# Patient Record
(demographics unavailable — no encounter records)

---

## 2017-01-01 NOTE — ERD
ER Documentation


Chief Complaint


Chief Complaint


fever today no meds since 1600





HPI


11 month and 15-day-old boy was brought in by parents here in the emergency 

department for fever, ear pulling since this morning.  Mother also stated that 

patient has nasal congestion. Mother stated that patient did not experience any 

head injury, neck stiffness, difficulty swallowing, loss of appetite, 

difficulty breathing when lying flat, abdominal pain, nausea, vomiting, 

constipation, diarrhea, urinary symptoms, recent travel, recent antibiotic use 

in the last 3 months, recent exposure to any illness, difficulty walking.





ROS


All systems reviewed and are negative except as per history of present illness.





Medications


Home Meds


Active Scripts


Acetaminophen* (Tylenol*) 160 Mg/5ML-Ped Cup, 3.5 ML PO Q4H Y for PAIN AND OR 

ELEVATED TEMP, #120 ML


   Prov:RIGO NOLEN PA-C         9/10/17


Amoxicillin* (Amoxicillin* Susp) 250 Mg/5 Ml Susp.recon, 3.9 ML PO BID for 10 

Days, BOTTLE


   Prov:RIGO NOLEN PA-C         9/10/17





Allergies


Allergies:  


Coded Allergies:  


     No Known Allergy (Unverified , 12/18/17)





PMhx/Soc


Medical and Surgical Hx:  pt denies Medical Hx, pt denies Surgical Hx


Hx Alcohol Use:  No


Hx Substance Use:  No


Hx Tobacco Use:  No


Smoking Status:  Never smoker





Physical Exam


Vitals





Vital Signs








  Date Time  Temp Pulse Resp B/P Pulse Ox O2 Delivery O2 Flow Rate FiO2


 


12/17/17 22:52 101.0 161 24  97   








Physical Exam


Const: Awake.  Age-appropriate.  Smiling.  Interacting.


Head:   Atraumatic 


Eyes:    Normal Conjunctiva


ENT:    Normal External Ears, Nose and Mouth.  Left ear: 40% earwax.  TM is 

erythematous.  Right ear: 52% earwax.  TM is erythematous.  No bleeding.  No 

discharge to bilateral ears.  Throat: Uvula is midline and not displaced.  

Tonsils are +2 bilaterally with redness and has no exudates.  Tolerating 

secretions.  Patent airway.


Neck:               Full range of motion..~ No meningismus.  Next stiffness.  

Negative Kernig sign.  Negative Brudzinski sign.  No signs of meningeal 

irritation.  


Resp:    Clear to auscultation bilaterally


Cardio:    Regular rate and rhythm, no murmurs


Abd:    Soft, non tender, non distended. Normal bowel sounds.  No abdominal 

tenderness.


Skin:    No petechiae or rashes


Back:    No midline or flank tenderness


Ext:    No cyanosis, or edema


Neur:    Awake and alert.  No neurological deficits.


Psych:    Normal Mood and Affect





Procedures/MDM


Treatment: Motrin.  Tylenol.  





Reevaluation: Temperature/fever is trending down.  No nausea and vomiting here 

in the emergency department.  Lung sounds are clear to auscultation.  No 

abdominal tenderness to likely palpation.  Negative Rovsing's sign.  Negative 

Markle sign.  Negative psoas sign.





Differential: I have low suspicion for severe or serious bacterial infection, 

sepsis, meningitis, pneumonia due to my physical exam.  Patient has no 

neurological deficits, no neck stiffness, no abdominal tenderness, lung sounds 

are clear to auscultation.





Final diagnosis: Pharyngitis, otitis media, fever.





Prescription: Augmentin.  Motrin.  Tylenol.





Follow-up with pediatrician the next 3-4 days.  Come back here in the emergency 

department for any new symptoms or any worsening of symptoms.  All questions 

and concerns are answered.  Patient and family member verbalized understanding 

and agreed with plan of care.





Hemodynamically stable on discharge.





Departure


Diagnosis:  


 Primary Impression:  


 Otitis media


 Additional Impressions:  


 Fever


 Pharyngitis


Condition:  Stable





Additional Instructions:  


Follow-up with pediatrician the next 3-4 days.  Come back here in the emergency 

department for any new symptoms or any worsening of symptoms.  All questions 

and concerns are answered.  Patient and family member verbalized understanding 

and agreed with plan of care.











SARA FERNANDO Dec 18, 2017 01:24

## 2017-01-01 NOTE — ERD
ER Documentation


Chief Complaint


Date/Time


DATE: 9/10/17 


TIME: 22:05


Chief Complaint


Temp today. 100.4 Motrin at 1930. Denies runny nose, cough, n/v/d





HPI


8-month-old male brought into the emergency department by mother for fever that 

started today.  States ibuprofen was given earlier today.  She denies any cough

, nausea, vomiting, diarrhea, constipation or congestion.  States that he does 

pull his left ear.





ROS


All systems reviewed and are negative except as per history of present illness.





Medications


Home Meds


Active Scripts


Acetaminophen* (Tylenol*) 160 Mg/5ML-Ped Cup, 3.5 ML PO Q4H Y for PAIN AND OR 

ELEVATED TEMP, #120 ML


   Prov:RIGO NOLEN PA-C         9/10/17


Amoxicillin* (Amoxicillin* Susp) 250 Mg/5 Ml Susp.recon, 3.9 ML PO BID for 10 

Days, BOTTLE


   Prov:RIGO NOLEN PA-C         9/10/17





Allergies


Allergies:  


Coded Allergies:  


     No Known Allergy (Unverified , 9/10/17)





PMhx/Soc


Medical and Surgical Hx:  pt denies Medical Hx, pt denies Surgical Hx


Hx Alcohol Use:  No


Hx Substance Use:  No


Hx Tobacco Use:  No


Smoking Status:  Never smoker





Physical Exam


Vitals





Vital Signs








  Date Time  Temp Pulse Resp B/P Pulse Ox O2 Delivery O2 Flow Rate FiO2


 


9/10/17 21:48 98.0       


 


9/10/17 21:17 99.4 134 28  99   








Physical Exam


Const:  [Well-developed well-nourished no acute distress


Head:   Atraumatic 


Eyes:    Normal Conjunctiva


ENT:    Normal External Ears, Nose and Mouth.


Dependent membrane is erythematous


Neck:               Full range of motion..~ No meningismus.


Resp:    Clear to auscultation bilaterally


Cardio:    Regular rate and rhythm, no murmurs


Abd:    Soft, non tender, non distended. Normal bowel sounds


Skin:    No petechiae or rashes


Back:    No midline or flank tenderness


Ext:    No cyanosis, or edema


Neur:    Awake and alert


Psych:    Normal Mood and Affect





Procedures/MDM


An 8-month-old male brought to the emergency department by mother for fever.  

On examination patient's tympanic membrane in the left ear appeared 

erythematous therefore he will be quickly treated for otitis media.  There was 

no evidence of pneumonia, strep pharyngitis, mastoiditis.  Patient was given 

Tylenol in the ED.  Prescription for amoxicillin and Tylenol is provided.  I 

have discussed the patient's mother to follow-up with pediatrician tomorrow.  

She understands and agrees with this plan





Departure


Diagnosis:  


 Primary Impression:  


 Otitis media


 Additional Impression:  


 Fever


Condition:  Stable


Patient Instructions:  Fever Control (Child), Otitis Media, Abx Tx [Child]





Additional Instructions:  


Visite a marcos luis antonio soto para un EXAMEN.Regrese a estas instalaciones si no se 

mejora frankie esperbamos o frankie le dijimos.





[place] toda la medicina deuce y frankie se le indic.





Regrese a estas instalaciones si no se mejora frankie esperbamos o frankie le 

dijimos.











RIGO NOLEN PA-C Sep 10, 2017 22:06